# Patient Record
(demographics unavailable — no encounter records)

---

## 2025-01-24 NOTE — HISTORY OF PRESENT ILLNESS
[de-identified] : The upper endoscopy performed at the [facility] [place] GI endoscopy suite on November 30, 2021 revealed a small hiatal hernia and mild diffuse gastritis. The gastric pathology performed on November 30, 2021 revealed esophageal mucosa with fragments of squamous esophageal epithelium with scattered eosinophils (2-5/hpf) compatible with a clinical history of gastroesophageal reflux disease with a PASF stain that is negative for fungal microorganisms (esophagus), gastric antral mucosa with chronic inactive gastritis that was negative for Helicobacter pylori (antrum), gastric body mucosa with chronic inactive gastritis with tissue changes suggestive of proton pump inhibitor effect that was negative for Helicobacter pylori (body of the stomach) and duodenal mucosa with preserved villous architecture with no parasites or increased intraepithelial lymphocytes (duodenum). \par  \par   [FreeTextEntry1] : The colonoscopy to the cecum and terminal ileum performed at the [facility] [place] GI endoscopy suite on November 30, 2021 revealed a transverse colon polyp, a rectal polyp and medium sized internal hemorrhoids. The rectal polyp was snared and removed. There was no bleeding post polypectomy. The rectal polyp was lost in the stool and not retrieved. The transverse colon polyp was completely removed with a jumbo biopsy forcep. There were no other polyps, masses, diverticulosis, AVMs or colitis noted. The colonic pathology performed on November 30, 2021 revealed a tubular adenoma (transverse colon polyp). \par  \par   [de-identified] : The abdominal ultrasound performed on July 24, 2022 revealed fatty infiltration of the liver status postcholecystectomy.    The abdominal ultrasound performed June 24, 2022 revealed multiple gallbladder polyps measuring up to 1.3 cm.

## 2025-01-24 NOTE — ASSESSMENT
[FreeTextEntry1] : Abdominal Pain: The patient complains of abdominal pain. The patient is to avoid nonsteroidal anti-inflammatory drugs and aspirin.  I recommend a low FOD-MAP diet.  I recommend a trial of Dicyclomine 10 mg tablet PO 3 times a day PRN for the abdominal pain. Dyspepsia: The patient complains of dyspeptic symptoms.  The patient was advised to continue to abide by an anti-gas (low FOD-MAP) diet.  The patient was previously given a pamphlet for anti-gas (low FOD-MAP).  The patient and I reviewed the anti-gas (low FOD-MAP) diet at length again. The patient is to continue on a trial of Simethicone one tablet 4 times a day p.r.n. abdominal pain and gas. History of Gallbladder Polyps: The patient has gallbladder polyps on prior abdominal ultrasound. The patient is s/p cholecystectomy by Dr. Brian Calero at the [facility] [place] on September 9, 2022. The patient tolerated the surgery well. The pathology revealed chronic cholecystitis and cholesterolosis. Gastritis: The patient has a history of gastritis. A prior upper endoscopy revealed evidence of gastritis. The patient is to avoid nonsteroidal anti-inflammatory drugs and aspirin. I recommend restart on a trial of pantoprazole 40 mg once a day for 3 months for the symptoms. Reflux Esophagitis: The patient had reflux esophagitis noted on prior upper endoscopy. The patient was advised to avoid late-night meals and dietary indiscretions. The patient was advised to avoid fried and fatty foods. The patient was advised to abide by an anti-GERD diet. The patient was given a pamphlet for anti-GERD. The patient and I reviewed the anti-GERD diet at length Hiatal Hernia: The patient was advised to avoid late-night meals and dietary indiscretions. The patient was advised to avoid fried and fatty foods. The patient was advised to abide by an anti-GERD diet. The patient was given a pamphlet for anti-GERD. The patient and I reviewed the anti-GERD diet at length. I recommend a trial of Pantoprazole 40 mg once a day for 3 months for the symptoms. Internal Hemorrhoids: The patient is to consider a trial of Anusol H. C. suppositories one per rectum nightly and Anusol HC2.5% cream apply to affected area twice a day p.r.n. hemorrhoidal bleeding or pain. History of Colonic Polyp: The patient was found to have colonic polyps on recent colonoscopy. I recommend a repeat colonoscopy in 2 years to reassess for colonic polyps pending patient's health unless symptomatic. The patient agreed and will follow up for the procedure. Family History of GI Malignancy: The patient has a family history of GI problems. I recommend a repeat colonoscopy in 2 years to reassess for colonic polyps pending patient's health unless symptomatic. The patient's mother had a history of colon cancer. Family History of Colon Cancer: The patient has a family history of colon cancer. I recommend a repeat colonoscopy in 2 years to reassess for colonic polyps pending patient's health unless symptomatic. The patient agreed and will follow up for the procedure. Follow-up: The patient is to follow-up in the office in 6 months to reassess the symptoms. The patient was told to call the office if any further problems.

## 2025-07-08 NOTE — PROCEDURE
[Straight Catheterization] : insertion of a straight catheter [Urinary Tract Infection] : a urinary tract infection [___ Fr Straight Tip] : a [unfilled] in Filipino straight tip catheter [None] : there were no complications with the catheter insertion [Clear] : clear [Culture] : culture

## 2025-07-08 NOTE — PROCEDURE
[Straight Catheterization] : insertion of a straight catheter [Urinary Tract Infection] : a urinary tract infection [___ Fr Straight Tip] : a [unfilled] in Nauruan straight tip catheter [None] : there were no complications with the catheter insertion [Clear] : clear [Culture] : culture

## 2025-07-08 NOTE — PROCEDURE
[Straight Catheterization] : insertion of a straight catheter [Urinary Tract Infection] : a urinary tract infection [___ Fr Straight Tip] : a [unfilled] in Tristanian straight tip catheter [None] : there were no complications with the catheter insertion [Clear] : clear [Culture] : culture

## 2025-07-09 NOTE — HISTORY OF PRESENT ILLNESS
[x1] : nocturia once nightly [Cystocele (Obstetric)] : no [Vaginal Wall Prolapse] : no [Rectal Prolapse] : no [Urinary Frequency] : no [Feelings Of Urinary Urgency] : no [Pain During Urination (Dysuria)] : no [] : years ago [Constipation Obstructed Defecation] : no [Stool Visible Blood] : no [Incomplete Emptying Of Stool] : no [de-identified] : sometimes with coughing, laughing, sneezing  [de-identified] : sometimes, a few drops  [de-identified] : 4-5x/d [de-identified] : sometimes [de-identified] : daily  [FreeTextEntry6] : BM 1-2x/d, soft stool, managing with diet and fiber  [FreeTextEntry1] : Carol is a postmenopausal 69yo who presents with leakage of urine with coughing, laughing, sneezing and sometimes with an urge. She was instructed to do pelvic floor exercises; however, she did not do them. She took Oxybutynin 10mg for 3d but stopped because she had some anxiety. She did not notice any changes to her urinary symptoms while taking it.   She is traveling outside the country in the next 2 weeks and will be out for 3 weeks.   UDS: +BETZAIDA, no DO, half-way 980ml, PVR 183ml   PMH: Fatty liver, HTN, HLD, anxiety/depression, asthma. Denies history of glaucoma.  PSH: Cholecystectomy, b/l cataracts  Soc: Never smoker  All: Had a reaction to an unknown medication  Daily fluid intake: 6-8c water + 1-2c coffee. No tea, juice, soda, seltzer.

## 2025-07-09 NOTE — DISCUSSION/SUMMARY
[Reviewed Clinical Lab Test(s)] : Results of clinical tests were reviewed. [FreeTextEntry1] : We reviewed her urinary symptoms and discussed possible etiologies including urinary tract infection, mixed stress and urgency incontinence. Her PVR today was 50ml. She had outside urodynamics that showed stress incontinence. There was no detrusor overactivity. Her jail was 980ml, PVR 183ml. Cath urine specimen was sent for urine culture to rule out urinary tract infection. We reviewed management options for both stress urinary incontinence and overactive bladder/urgency incontinence. We discussed management options for stress incontinence including observation, pelvic floor exercises with or without physical therapy, continence devices, urethral bulking, surgical management. We discussed management options for overactive bladder including observation, fluid and behavioral modifications, bladder training, physical therapy and medications including anticholinergics and beta 3 agonists. Specifically, we discussed avoidance of bladder irritants such as caffeine, carbonation, artificial sweeteners, alcohol, acidic foods/drinks (citrus, tomatoes, pineapple), spicy foods, and chocolate. We also discussed drinking 1.5-2L of plain water to maintain adequate hydration as urine that is too concentrated can also be irritating to the bladder. In addition, we reviewed drinking slowly since ingesting large quantities of fluid can result in rapid distension of the bladder, which can worsen urinary symptoms. We discussed that there are many overactive bladder medications; however, none have been clearly shown to be superior to the others, and they differ in their side effects. Side effects include dry eye, dry mouth, constipation, hypertension, swelling, dizziness, and memory problems. We discussed that we may need to trial a few medications to find one that is effective at controlling her symptoms with an acceptable side effect profile.  At this time, she wants to start with fluid and behavioral modifications and with pelvic floor PT after she returns from her trip. Referral placed. She had Stage 2 cystocele/rectocele on exam today but is asymptomatic. We will defer discussion until her next visit.   IUGA handout on OAB, bladder training, BETZAIDA was given to her in Peruvian. RTO 3m for KATARINA follow up and discussion of her prolapse.

## 2025-07-09 NOTE — HISTORY OF PRESENT ILLNESS
[x1] : nocturia once nightly [Cystocele (Obstetric)] : no [Vaginal Wall Prolapse] : no [Rectal Prolapse] : no [Urinary Frequency] : no [Feelings Of Urinary Urgency] : no [Pain During Urination (Dysuria)] : no [] : years ago [Constipation Obstructed Defecation] : no [Stool Visible Blood] : no [Incomplete Emptying Of Stool] : no [de-identified] : sometimes with coughing, laughing, sneezing  [de-identified] : sometimes, a few drops  [de-identified] : 4-5x/d [de-identified] : sometimes [de-identified] : daily  [FreeTextEntry6] : BM 1-2x/d, soft stool, managing with diet and fiber  [FreeTextEntry1] : Carol is a postmenopausal 69yo who presents with leakage of urine with coughing, laughing, sneezing and sometimes with an urge. She was instructed to do pelvic floor exercises; however, she did not do them. She took Oxybutynin 10mg for 3d but stopped because she had some anxiety. She did not notice any changes to her urinary symptoms while taking it.   She is traveling outside the country in the next 2 weeks and will be out for 3 weeks.   UDS: +BETZAIDA, no DO, jail 980ml, PVR 183ml   PMH: Fatty liver, HTN, HLD, anxiety/depression, asthma. Denies history of glaucoma.  PSH: Cholecystectomy, b/l cataracts  Soc: Never smoker  All: Had a reaction to an unknown medication  Daily fluid intake: 6-8c water + 1-2c coffee. No tea, juice, soda, seltzer.

## 2025-07-09 NOTE — DISCUSSION/SUMMARY
[Reviewed Clinical Lab Test(s)] : Results of clinical tests were reviewed. [FreeTextEntry1] : We reviewed her urinary symptoms and discussed possible etiologies including urinary tract infection, mixed stress and urgency incontinence. Her PVR today was 50ml. She had outside urodynamics that showed stress incontinence. There was no detrusor overactivity. Her FCI was 980ml, PVR 183ml. Cath urine specimen was sent for urine culture to rule out urinary tract infection. We reviewed management options for both stress urinary incontinence and overactive bladder/urgency incontinence. We discussed management options for stress incontinence including observation, pelvic floor exercises with or without physical therapy, continence devices, urethral bulking, surgical management. We discussed management options for overactive bladder including observation, fluid and behavioral modifications, bladder training, physical therapy and medications including anticholinergics and beta 3 agonists. Specifically, we discussed avoidance of bladder irritants such as caffeine, carbonation, artificial sweeteners, alcohol, acidic foods/drinks (citrus, tomatoes, pineapple), spicy foods, and chocolate. We also discussed drinking 1.5-2L of plain water to maintain adequate hydration as urine that is too concentrated can also be irritating to the bladder. In addition, we reviewed drinking slowly since ingesting large quantities of fluid can result in rapid distension of the bladder, which can worsen urinary symptoms. We discussed that there are many overactive bladder medications; however, none have been clearly shown to be superior to the others, and they differ in their side effects. Side effects include dry eye, dry mouth, constipation, hypertension, swelling, dizziness, and memory problems. We discussed that we may need to trial a few medications to find one that is effective at controlling her symptoms with an acceptable side effect profile.  At this time, she wants to start with fluid and behavioral modifications and with pelvic floor PT after she returns from her trip. Referral placed. She had Stage 2 cystocele/rectocele on exam today but is asymptomatic. We will defer discussion until her next visit.   IUGA handout on OAB, bladder training, BETZAIDA was given to her in Nigerien. RTO 3m for KATARINA follow up and discussion of her prolapse.

## 2025-07-09 NOTE — DISCUSSION/SUMMARY
[Reviewed Clinical Lab Test(s)] : Results of clinical tests were reviewed. [FreeTextEntry1] : We reviewed her urinary symptoms and discussed possible etiologies including urinary tract infection, mixed stress and urgency incontinence. Her PVR today was 50ml. She had outside urodynamics that showed stress incontinence. There was no detrusor overactivity. Her intermediate was 980ml, PVR 183ml. Cath urine specimen was sent for urine culture to rule out urinary tract infection. We reviewed management options for both stress urinary incontinence and overactive bladder/urgency incontinence. We discussed management options for stress incontinence including observation, pelvic floor exercises with or without physical therapy, continence devices, urethral bulking, surgical management. We discussed management options for overactive bladder including observation, fluid and behavioral modifications, bladder training, physical therapy and medications including anticholinergics and beta 3 agonists. Specifically, we discussed avoidance of bladder irritants such as caffeine, carbonation, artificial sweeteners, alcohol, acidic foods/drinks (citrus, tomatoes, pineapple), spicy foods, and chocolate. We also discussed drinking 1.5-2L of plain water to maintain adequate hydration as urine that is too concentrated can also be irritating to the bladder. In addition, we reviewed drinking slowly since ingesting large quantities of fluid can result in rapid distension of the bladder, which can worsen urinary symptoms. We discussed that there are many overactive bladder medications; however, none have been clearly shown to be superior to the others, and they differ in their side effects. Side effects include dry eye, dry mouth, constipation, hypertension, swelling, dizziness, and memory problems. We discussed that we may need to trial a few medications to find one that is effective at controlling her symptoms with an acceptable side effect profile.  At this time, she wants to start with fluid and behavioral modifications and with pelvic floor PT after she returns from her trip. Referral placed. She had Stage 2 cystocele/rectocele on exam today but is asymptomatic. We will defer discussion until her next visit.   IUGA handout on OAB, bladder training, BETZAIDA was given to her in Swazi. RTO 3m for KATARINA follow up and discussion of her prolapse.

## 2025-07-09 NOTE — LETTER BODY
[Dear  ___] : Dear  [unfilled], [I had the pleasure of evaluating your patient, [unfilled]. Thank you for referring Ms. [unfilled] for consultation for ___] : I had the pleasure of evaluating your patient, [unfilled]. Thank you for referring Ms. [unfilled] for consultation for [unfilled]. [Attached please find my note.] : Attached please find my note. [Thank you very much for allowing me to participate in the care of this patient. If you have any questions, please do not hesitate to contact me] : Thank you very much for allowing me to participate in the care of this patient. If you have any questions, please do not hesitate to contact me. [FreeTextEntry1] : mixed stress and urgency incontinence

## 2025-07-09 NOTE — HISTORY OF PRESENT ILLNESS
[x1] : nocturia once nightly [Cystocele (Obstetric)] : no [Vaginal Wall Prolapse] : no [Rectal Prolapse] : no [Urinary Frequency] : no [Feelings Of Urinary Urgency] : no [Pain During Urination (Dysuria)] : no [] : years ago [Constipation Obstructed Defecation] : no [Stool Visible Blood] : no [Incomplete Emptying Of Stool] : no [de-identified] : sometimes with coughing, laughing, sneezing  [de-identified] : sometimes, a few drops  [de-identified] : 4-5x/d [de-identified] : sometimes [de-identified] : daily  [FreeTextEntry6] : BM 1-2x/d, soft stool, managing with diet and fiber  [FreeTextEntry1] : Carol is a postmenopausal 69yo who presents with leakage of urine with coughing, laughing, sneezing and sometimes with an urge. She was instructed to do pelvic floor exercises; however, she did not do them. She took Oxybutynin 10mg for 3d but stopped because she had some anxiety. She did not notice any changes to her urinary symptoms while taking it.   She is traveling outside the country in the next 2 weeks and will be out for 3 weeks.   UDS: +BETZAIDA, no DO, skilled nursing 980ml, PVR 183ml   PMH: Fatty liver, HTN, HLD, anxiety/depression, asthma. Denies history of glaucoma.  PSH: Cholecystectomy, b/l cataracts  Soc: Never smoker  All: Had a reaction to an unknown medication  Daily fluid intake: 6-8c water + 1-2c coffee. No tea, juice, soda, seltzer.

## 2025-07-09 NOTE — REASON FOR VISIT
[Initial Visit ___] : an initial visit for [unfilled] [Language Line ] : provided by Language Line   [Urinary Urgency] : urinary urgency [Interpreters_IDNumber] : [id number] [Interpreters_FullName] : Tommie [TWNoteComboBox1] : Burkinan

## 2025-07-09 NOTE — REASON FOR VISIT
[Initial Visit ___] : an initial visit for [unfilled] [Language Line ] : provided by Language Line   [Urinary Urgency] : urinary urgency [Interpreters_IDNumber] : [id number] [Interpreters_FullName] : Tommie [TWNoteComboBox1] : Italian

## 2025-07-09 NOTE — REASON FOR VISIT
[Initial Visit ___] : an initial visit for [unfilled] [Language Line ] : provided by Language Line   [Urinary Urgency] : urinary urgency [Interpreters_IDNumber] : [id number] [Interpreters_FullName] : Tommie [TWNoteComboBox1] : Maldivian

## 2025-07-09 NOTE — PHYSICAL EXAM
[MA] : MA [Labia Majora] : were normal [Labia Minora] : were normal [Normal Appearance] : general appearance was normal [Atrophy] : atrophy [Normal] : no abnormalities [Exam Deferred] : was deferred [Aa ____] : Aa [unfilled] [Ba ____] : Ba [unfilled] [C ____] : C [unfilled] [GH ____] : GH [unfilled] [PB ____] : PB [unfilled] [TVL ____] : TVL  [unfilled] [Ap ____] : Ap [unfilled] [Bp ____] : Bp [unfilled] [D ____] : D [unfilled] [FreeTextEntry2] : Savita [FreeTextEntry1] : General: Well, appearing. Alert and orientated. No acute distress HEENT: Normocephalic, atraumatic and extraocular muscles appear to be intact Neck: Full range of motion, no obvious lymphadenopathy, deformities, or masses noted Respiratory: Speaking in full sentences comfortably, normal work of breathing and no cough during visit Musculoskeletal: full range of motion  Extremities: No upper extremity edema noted, 1+ b/l L edema Skin: no obvious rash or skin lesions Neuro: Orientated X 3, speech is fluent, normal rate Psych: Normal mood and affect  [Tenderness] : ~T no ~M abdominal tenderness observed [Distended] : not distended [Scar] : no scars [FreeTextEntry3] : (-) hypermobility, (-) cough stress test